# Patient Record
Sex: FEMALE | Race: BLACK OR AFRICAN AMERICAN | NOT HISPANIC OR LATINO | Employment: FULL TIME | URBAN - METROPOLITAN AREA
[De-identification: names, ages, dates, MRNs, and addresses within clinical notes are randomized per-mention and may not be internally consistent; named-entity substitution may affect disease eponyms.]

---

## 2021-10-27 RX ORDER — CLONIDINE HYDROCHLORIDE 0.1 MG/1
TABLET ORAL
COMMUNITY

## 2021-10-27 RX ORDER — HYDROCHLOROTHIAZIDE 25 MG/1
25 TABLET ORAL
COMMUNITY
Start: 2020-07-21

## 2021-10-27 RX ORDER — ERGOCALCIFEROL 1.25 MG/1
CAPSULE ORAL
COMMUNITY

## 2021-10-27 RX ORDER — GABAPENTIN 300 MG/1
300 CAPSULE ORAL
COMMUNITY
Start: 2020-08-17

## 2021-10-27 RX ORDER — ANASTROZOLE 1 MG/1
TABLET ORAL
COMMUNITY

## 2021-10-27 RX ORDER — DILTIAZEM HYDROCHLORIDE 360 MG/1
360 CAPSULE, EXTENDED RELEASE ORAL
COMMUNITY
Start: 2020-09-03

## 2021-10-27 RX ORDER — ALBUTEROL SULFATE 90 UG/1
2 AEROSOL, METERED RESPIRATORY (INHALATION) EVERY 4 HOURS PRN
COMMUNITY
Start: 2020-11-14 | End: 2023-01-31

## 2021-10-27 RX ORDER — DOCUSATE SODIUM 100 MG/1
CAPSULE, LIQUID FILLED ORAL DAILY PRN
COMMUNITY
End: 2021-10-29

## 2021-10-27 RX ORDER — AMOXICILLIN 500 MG
2 CAPSULE ORAL
COMMUNITY

## 2023-01-25 ENCOUNTER — TELEPHONE (OUTPATIENT)
Dept: PULMONOLOGY | Facility: CLINIC | Age: 58
End: 2023-01-25
Payer: COMMERCIAL

## 2023-01-25 DIAGNOSIS — R05.9 COUGH, UNSPECIFIED TYPE: Primary | ICD-10-CM

## 2023-01-25 NOTE — TELEPHONE ENCOUNTER
----- Message from Toyin Abraham sent at 1/25/2023 12:53 PM CST -----  Contact: layla  Patient is calling to speak with the nurse regarding referral. Reports getting a referral sent over an needs to schedule an appointment. Please give the patient a call back at [phone]  Thanks janine

## 2023-01-31 ENCOUNTER — HOSPITAL ENCOUNTER (OUTPATIENT)
Dept: RADIOLOGY | Facility: HOSPITAL | Age: 58
Discharge: HOME | End: 2023-01-31
Attending: INTERNAL MEDICINE
Payer: COMMERCIAL

## 2023-01-31 ENCOUNTER — OFFICE VISIT (OUTPATIENT)
Dept: PULMONOLOGY | Facility: CLINIC | Age: 58
End: 2023-01-31
Payer: COMMERCIAL

## 2023-01-31 ENCOUNTER — CLINICAL SUPPORT (OUTPATIENT)
Dept: PULMONOLOGY | Facility: CLINIC | Age: 58
End: 2023-01-31
Payer: COMMERCIAL

## 2023-01-31 VITALS
SYSTOLIC BLOOD PRESSURE: 128 MMHG | HEIGHT: 68 IN | WEIGHT: 270.06 LBS | OXYGEN SATURATION: 96 % | BODY MASS INDEX: 40.93 KG/M2 | HEART RATE: 59 BPM | RESPIRATION RATE: 18 BRPM | DIASTOLIC BLOOD PRESSURE: 92 MMHG

## 2023-01-31 DIAGNOSIS — J45.991 COUGH VARIANT ASTHMA: ICD-10-CM

## 2023-01-31 DIAGNOSIS — R05.3 CHRONIC COUGH: Primary | ICD-10-CM

## 2023-01-31 DIAGNOSIS — R05.9 COUGH, UNSPECIFIED TYPE: ICD-10-CM

## 2023-01-31 PROCEDURE — 71046 XR CHEST PA AND LATERAL: ICD-10-PCS | Mod: 26,,, | Performed by: RADIOLOGY

## 2023-01-31 PROCEDURE — 99205 OFFICE O/P NEW HI 60 MIN: CPT | Mod: 25,S$GLB,, | Performed by: INTERNAL MEDICINE

## 2023-01-31 PROCEDURE — 95012 PR NITRIC OXIDE EXPIRED GAS DETERMINATION: ICD-10-PCS | Mod: S$GLB,,, | Performed by: INTERNAL MEDICINE

## 2023-01-31 PROCEDURE — 71046 X-RAY EXAM CHEST 2 VIEWS: CPT | Mod: TC

## 2023-01-31 PROCEDURE — 99999 PR PBB SHADOW E&M-EST. PATIENT-LVL V: ICD-10-PCS | Mod: PBBFAC,,, | Performed by: INTERNAL MEDICINE

## 2023-01-31 PROCEDURE — 99999 PR PBB SHADOW E&M-EST. PATIENT-LVL I: CPT | Mod: PBBFAC,,,

## 2023-01-31 PROCEDURE — 99999 PR PBB SHADOW E&M-EST. PATIENT-LVL I: ICD-10-PCS | Mod: PBBFAC,,,

## 2023-01-31 PROCEDURE — 71046 X-RAY EXAM CHEST 2 VIEWS: CPT | Mod: 26,,, | Performed by: RADIOLOGY

## 2023-01-31 PROCEDURE — 95012 NITRIC OXIDE EXP GAS DETER: CPT | Mod: S$GLB,,, | Performed by: INTERNAL MEDICINE

## 2023-01-31 PROCEDURE — 99999 PR PBB SHADOW E&M-EST. PATIENT-LVL V: CPT | Mod: PBBFAC,,, | Performed by: INTERNAL MEDICINE

## 2023-01-31 PROCEDURE — 99205 PR OFFICE/OUTPT VISIT, NEW, LEVL V, 60-74 MIN: ICD-10-PCS | Mod: 25,S$GLB,, | Performed by: INTERNAL MEDICINE

## 2023-01-31 RX ORDER — AZITHROMYCIN 250 MG/1
TABLET, FILM COATED ORAL
Qty: 6 TABLET | Refills: 0 | Status: SHIPPED | OUTPATIENT
Start: 2023-01-31 | End: 2023-12-04

## 2023-01-31 RX ORDER — FLUTICASONE PROPIONATE AND SALMETEROL 250; 50 UG/1; UG/1
1 POWDER RESPIRATORY (INHALATION) 2 TIMES DAILY
Qty: 60 EACH | Refills: 11 | Status: SHIPPED | OUTPATIENT
Start: 2023-01-31 | End: 2023-06-28 | Stop reason: SDUPTHER

## 2023-01-31 RX ORDER — OLMESARTAN MEDOXOMIL 40 MG/1
40 TABLET ORAL
COMMUNITY
Start: 2023-01-04

## 2023-01-31 RX ORDER — ALBUTEROL SULFATE 0.83 MG/ML
2.5 SOLUTION RESPIRATORY (INHALATION)
Qty: 360 ML | Refills: 11 | Status: SHIPPED | OUTPATIENT
Start: 2023-01-31 | End: 2023-06-28 | Stop reason: SDUPTHER

## 2023-01-31 RX ORDER — ALBUTEROL SULFATE 90 UG/1
2 AEROSOL, METERED RESPIRATORY (INHALATION) EVERY 4 HOURS PRN
Qty: 18 G | Refills: 11 | Status: SHIPPED | OUTPATIENT
Start: 2023-01-31 | End: 2023-06-28 | Stop reason: SDUPTHER

## 2023-01-31 RX ORDER — PROMETHAZINE HYDROCHLORIDE AND DEXTROMETHORPHAN HYDROBROMIDE 6.25; 15 MG/5ML; MG/5ML
5 SYRUP ORAL 4 TIMES DAILY PRN
Qty: 240 ML | Refills: 5 | Status: SHIPPED | OUTPATIENT
Start: 2023-01-31 | End: 2023-06-28 | Stop reason: SDUPTHER

## 2023-01-31 RX ORDER — PREDNISONE 20 MG/1
TABLET ORAL
Qty: 20 TABLET | Refills: 0 | Status: SHIPPED | OUTPATIENT
Start: 2023-01-31 | End: 2023-06-28 | Stop reason: ALTCHOICE

## 2023-01-31 RX ORDER — BUDESONIDE 90 UG/1
AEROSOL, POWDER RESPIRATORY (INHALATION) 2 TIMES DAILY
COMMUNITY
Start: 2022-10-21 | End: 2023-01-31 | Stop reason: ALTCHOICE

## 2023-01-31 RX ORDER — IBUPROFEN 100 MG/5ML
1000 SUSPENSION, ORAL (FINAL DOSE FORM) ORAL
COMMUNITY

## 2023-01-31 NOTE — PROCEDURES
Clinical Guide to Interpretation or FeNO Levels :    FeNO  (ppb) LOW INTERMEDIATE HIGH   ADULT VALUES < 25 25-50          > 50   Th2-driven Inflammation Unlikely Likely Significant     Patients FeNO level at this visit : __190__ (ppb)    Interpretation of FeNO measurement in adults:  [] FENO is less than 25 ppb implies non eosinophilic airway inflammation or the absence of airway inflammation.    Comment: Low FENO (<25 ppb) in adult asthmatics with persistent symptoms suggests other etiologies for these symptoms and a lower likelihood of benefit from adding or increasing inhaled glucocorticoids.    [] FENO between 25 and 50 ppb in adults should be interpreted cautiously with reference to the clinical situation (eg, symptomatic, on or off therapy, current smoking).    [x] FENO greater than 50 ppb in adults  suggests eosinophilic airway inflammation   Comment: High FENO (>50 ppb) in adult asthmatics even with atypical symptoms suggests glucocorticoid responsiveness. High FENO (>50 ppb) can help identify poor adherence or uncontrolled inflammation in asthma patients with otherwise seemingly controlled asthma.    Discussion:  A FENO less than 25 ppb in adults and less than 20 ppb in children younger than 12 years of age implies noneosinophilic airway inflammation or the absence of airway inflammation.  A FENO greater than 50 ppb in adults or greater than 35 ppb in children suggests eosinophilic airway inflammation.   Values of FENO between 25 and 50 ppb in adults (20 to 35 ppb in children) should be interpreted cautiously with reference to the clinical situation (eg, symptomatic, on or off therapy, current smoking).  A rising FENO with a greater than 20 percent change and more than 25 ppb (20 ppb in children) from a previously stable level suggests increasing eosinophilic airway inflammation, but there are wide inter-individual differences.  A decrease in FENO greater than 20 percent for values over 50 ppb or more  than 10 ppb for values less than 50 ppb may be clinically important.  ?FENO in other respiratory diseases - Several other diseases are associated with altered levels of exhaled NO: low levels of FENO have been noted in cystic fibrosis, current smoking, pulmonary hypertension, hypothermia, primary ciliary dyskinesia, and bronchopulmonary dysplasia. Elevated FENO has been noted in atopy, nonasthmatic eosinophilic bronchitis, COPD exacerbations, noncystic fibrosis bronchiectasis, and viral upper respiratory infections.    REFERENCE:  ATS Board of Directors, December 2004, and by the ERS Executive Committee, June 2004. ATS/ERS Recommendations for Standardized Procedures for the Online and Offline Measurement of Exhaled Lower Respiratory Nitric Oxide and Nasal Nitric Oxide. Guideline 2005

## 2023-01-31 NOTE — PROGRESS NOTES
Subjective:     Patient ID: Nikki Small is a 57 y.o. female.    Chief Complaint:      HPI Chronic cough after COVID in 2020  ( not vaccinated)   Hx of strept throat - tonsils removed in 30 y/o     History of breast cancer with radiation and chemo in 2015  Hs of respirtaroty infections following breast acacner    Cough  Patient complains of nonproductive cough, productive cough, productive cough with sputum described as yellow, and sneezing. Symptoms began 2  years  ago - folowing COVID pneumonia - hospittalized for 4 days - at [facility] . Symptoms have been unchanged since that time.The cough is barky, nonproductive, and paroxysmal and is aggravated by reclining position. Associated symptoms include: postnasal drip and shortness of breath. Patient does not have new pets. Patient does not have a history of asthma. Patient does not have a history of environmental allergens. Patient has not traveled recently. Patient does not have a history of smoking. Patient has had a previous chest x-ray. Patient has not had a PPD done.      Past Medical History:   Diagnosis Date    Breast cancer, left 2014    Fibroids     Gout     HTN (hypertension)     Hyperlipidemia      Past Surgical History:   Procedure Laterality Date    BILATERAL SALPINGOOPHORECTOMY      BREAST BIOPSY Left 11/2014    BREAST LUMPECTOMY Left 12/2014    CERVICAL DISC SURGERY      HYSTERECTOMY  11/2013    LIV BSO    MYOMECTOMY       Review of patient's allergies indicates:   Allergen Reactions    Hydromorphone (bulk) Itching    Meperidine Itching    Morphine Itching     Current Outpatient Medications on File Prior to Visit   Medication Sig Dispense Refill    ALLOPURINOL ORAL       anastrozole (ARIMIDEX) 1 mg Tab anastrozole 1 mg oral tablet take 1 tablet (1 mg) by oral route once daily   Active      ascorbic acid, vitamin C, (VITAMIN C) 1000 MG tablet Take 1,000 mg by mouth.      atorvastatin calcium (ATORVASTATIN ORAL)       calcium carbonate/vitamin D3 (VITAMIN D-3  ORAL)       CALCIUM ORAL Take by mouth.      cloNIDine (CATAPRES) 0.1 MG tablet clonidine HCl 0.1 mg oral tablet take 1 tablet (0.1 mg) by oral route 2 times per day for 30 days   Active      diltiaZEM (TIAZAC) 360 MG Cs24 Take 360 mg by mouth.      ergocalciferol (ERGOCALCIFEROL) 50,000 unit Cap       gabapentin (NEURONTIN) 300 MG capsule Take 300 mg by mouth.      hydroCHLOROthiazide (HYDRODIURIL) 25 MG tablet Take 25 mg by mouth.      HYDROCHLOROTHIAZIDE ORAL       miconazole NITRATE 2 % (MICOTIN) 2 % top powder Apply topically 2 (two) times daily as needed for Itching. 71 g 0    olmesartan (BENICAR) 40 MG tablet Take 40 mg by mouth.      omega-3 fatty acids/fish oil (FISH OIL-OMEGA-3 FATTY ACIDS) 300-1,000 mg capsule Take 2 g by mouth.      promethazine HCl (PHENERGAN ORAL)       VITAMIN B COMPLEX ORAL Take 1 tablet by mouth.      [DISCONTINUED] albuterol (PROVENTIL/VENTOLIN HFA) 90 mcg/actuation inhaler Inhale 2 puffs into the lungs every 4 (four) hours as needed.      [DISCONTINUED] budesonide (PULMICORT FLEXHALER) 90 mcg/actuation AePB 2 (two) times daily.      clobetasoL (TEMOVATE) 0.05 % cream Apply topically once daily. for 10 days 60 g 0     No current facility-administered medications on file prior to visit.     Social History     Socioeconomic History    Marital status: Single   Tobacco Use    Smoking status: Never    Smokeless tobacco: Never   Substance and Sexual Activity    Alcohol use: Never    Drug use: Never    Sexual activity: Yes     Partners: Male     Family History   Problem Relation Age of Onset    Hypertension Mother     Hypertension Father     Hypertension Maternal Grandmother     Heart disease Maternal Grandmother     Lung cancer Maternal Grandfather     Heart disease Paternal Grandmother     Breast cancer Paternal Aunt        Review of Systems   Constitutional:  Positive for fatigue. Negative for fever.   HENT:  Positive for postnasal drip, rhinorrhea and congestion.    Eyes:  Negative  "for redness and itching.   Respiratory:  Positive for cough, sputum production, shortness of breath, dyspnea on extertion, use of rescue inhaler and Paroxysmal Nocturnal Dyspnea.    Cardiovascular:  Negative for chest pain, palpitations and leg swelling.   Genitourinary:  Negative for difficulty urinating and hematuria.   Endocrine:  Negative for cold intolerance and heat intolerance.    Skin:  Negative for rash.   Gastrointestinal:  Negative for nausea and abdominal pain.   Neurological:  Negative for dizziness, syncope, weakness and light-headedness.   Hematological:  Negative for adenopathy. Does not bruise/bleed easily.   Psychiatric/Behavioral:  Negative for sleep disturbance. The patient is not nervous/anxious.      Objective:      BP (!) 128/92   Pulse (!) 59   Resp 18   Ht 5' 8" (1.727 m)   Wt 122.5 kg (270 lb 1 oz)   SpO2 96%   BMI 41.06 kg/m²   Physical Exam  Vitals and nursing note reviewed.   Constitutional:       Appearance: She is well-developed. She is obese.   HENT:      Head: Normocephalic and atraumatic.      Nose: Nose normal.      Mouth/Throat:      Pharynx: No oropharyngeal exudate.   Eyes:      Conjunctiva/sclera: Conjunctivae normal.      Pupils: Pupils are equal, round, and reactive to light.   Neck:      Thyroid: No thyromegaly.      Vascular: No JVD.      Trachea: No tracheal deviation.   Cardiovascular:      Rate and Rhythm: Normal rate and regular rhythm.      Heart sounds: Normal heart sounds.   Pulmonary:      Effort: Pulmonary effort is normal. No respiratory distress.      Breath sounds: Examination of the right-lower field reveals wheezing. Examination of the left-lower field reveals wheezing. Decreased breath sounds and wheezing present. No rhonchi or rales.   Chest:      Chest wall: No tenderness.   Abdominal:      General: Bowel sounds are normal.      Palpations: Abdomen is soft.   Musculoskeletal:         General: Normal range of motion.      Cervical back: Neck supple. "   Lymphadenopathy:      Cervical: No cervical adenopathy.   Skin:     General: Skin is warm and dry.   Neurological:      Mental Status: She is alert and oriented to person, place, and time.     Personal Diagnostic Review  Chest x-ray: atelectasis on the left     Pulmonary Studies Review 2023   SpO2 96   Height 68   Weight 4321.02   BMI (Calculated) 41.1   Predicted Distance 295.06   Predicted Distance Meters (Calculated) 427.19       X-Ray Chest PA And Lateral  Narrative: EXAM:  XR CHEST PA AND LATERAL    CLINICAL HISTORY: Cough    COMPARISON: None available.    FINDINGS: No confluent airspace opacity or consolidation.  Atelectasis seen within the left upper lobe phase are more conspicuous on lateral view.  Recommend interval follow-up chest radiograph.  There is no evidence of pleural effusion, pneumothorax, or other acute pulmonary disease.  The cardiomediastinal silhouette is within normal limits.  No acute osseous abnormality is evident.  Atherosclerotic disease.  Impression:  Atelectasis seen within the left upper lobe phase are more conspicuous on lateral view.  Recommend interval follow-up chest radiograph.    Finalized on: 2023 9:53 AM By:  Francisco Dumont MD  [facility]# [id number]      2023 09:56:01.660    [facility]      Office Spirometry Results:     No flowsheet data found.  Pulmonary Studies Review 2023   SpO2 96   Height 68   Weight 4321.02   BMI (Calculated) 41.1   Predicted Distance 295.06   Predicted Distance Meters (Calculated) 427.19         Assessment:            Chronic cough  -     CT Chest Without Contrast; Future; Expected date: 2023  -     promethazine-dextromethorphan (PROMETHAZINE-DM) 6.25-15 mg/5 mL Syrp; Take 5 mLs by mouth 4 (four) times daily as needed (cough).  Dispense: 240 mL; Refill: 5    Cough variant asthma  -     Complete PFT with bronchodilator; Future; Expected date: 2023  -     Fraction of  Nitric Oxide; Future  -     fluticasone-salmeterol diskus  inhaler 250-50 mcg; Inhale 1 puff into the lungs 2 (two) times daily. Controller.Wash out mouth after use  Dispense: 60 each; Refill: 11  -     predniSONE (DELTASONE) 20 MG tablet; Prednisone 60 mg/ day for 3 days, 40 mg/day for 3 days,20 mg/ day for 3 days, (1/2 tablet )10 mg a day for 3 days.  Dispense: 20 tablet; Refill: 0  -     azithromycin (ZITHROMAX Z-MEMO) 250 MG tablet; 500 mg on day 1 (two tablets) followed by 250 mg once daily on days 2-5  Dispense: 6 tablet; Refill: 0  -     albuterol (PROVENTIL) 2.5 mg /3 mL (0.083 %) nebulizer solution; Take 3 mLs (2.5 mg total) by nebulization every 4 to 6 hours as needed for Wheezing or Shortness of Breath.  Dispense: 360 mL; Refill: 11  -     NEBULIZER KIT (SUPPLIES) FOR HOME USE  -     NEBULIZER FOR HOME USE  -     promethazine-dextromethorphan (PROMETHAZINE-DM) 6.25-15 mg/5 mL Syrp; Take 5 mLs by mouth 4 (four) times daily as needed (cough).  Dispense: 240 mL; Refill: 5  -     albuterol (PROVENTIL/VENTOLIN HFA) 90 mcg/actuation inhaler; Inhale 2 puffs into the lungs every 4 (four) hours as needed for Wheezing or Shortness of Breath.  Dispense: 18 g; Refill: 11          Outpatient Encounter Medications as of 1/31/2023   Medication Sig Dispense Refill    ALLOPURINOL ORAL       anastrozole (ARIMIDEX) 1 mg Tab anastrozole 1 mg oral tablet take 1 tablet (1 mg) by oral route once daily   Active      ascorbic acid, vitamin C, (VITAMIN C) 1000 MG tablet Take 1,000 mg by mouth.      atorvastatin calcium (ATORVASTATIN ORAL)       calcium carbonate/vitamin D3 (VITAMIN D-3 ORAL)       CALCIUM ORAL Take by mouth.      cloNIDine (CATAPRES) 0.1 MG tablet clonidine HCl 0.1 mg oral tablet take 1 tablet (0.1 mg) by oral route 2 times per day for 30 days   Active      diltiaZEM (TIAZAC) 360 MG Cs24 Take 360 mg by mouth.      ergocalciferol (ERGOCALCIFEROL) 50,000 unit Cap       gabapentin (NEURONTIN) 300 MG capsule Take 300 mg by mouth.      hydroCHLOROthiazide (HYDRODIURIL) 25 MG  tablet Take 25 mg by mouth.      HYDROCHLOROTHIAZIDE ORAL       miconazole NITRATE 2 % (MICOTIN) 2 % top powder Apply topically 2 (two) times daily as needed for Itching. 71 g 0    olmesartan (BENICAR) 40 MG tablet Take 40 mg by mouth.      omega-3 fatty acids/fish oil (FISH OIL-OMEGA-3 FATTY ACIDS) 300-1,000 mg capsule Take 2 g by mouth.      promethazine HCl (PHENERGAN ORAL)       VITAMIN B COMPLEX ORAL Take 1 tablet by mouth.      [DISCONTINUED] albuterol (PROVENTIL/VENTOLIN HFA) 90 mcg/actuation inhaler Inhale 2 puffs into the lungs every 4 (four) hours as needed.      [DISCONTINUED] budesonide (PULMICORT FLEXHALER) 90 mcg/actuation AePB 2 (two) times daily.      albuterol (PROVENTIL) 2.5 mg /3 mL (0.083 %) nebulizer solution Take 3 mLs (2.5 mg total) by nebulization every 4 to 6 hours as needed for Wheezing or Shortness of Breath. 360 mL 11    albuterol (PROVENTIL/VENTOLIN HFA) 90 mcg/actuation inhaler Inhale 2 puffs into the lungs every 4 (four) hours as needed for Wheezing or Shortness of Breath. 18 g 11    azithromycin (ZITHROMAX Z-MEMO) 250 MG tablet 500 mg on day 1 (two tablets) followed by 250 mg once daily on days 2-5 6 tablet 0    clobetasoL (TEMOVATE) 0.05 % cream Apply topically once daily. for 10 days 60 g 0    fluticasone-salmeterol diskus inhaler 250-50 mcg Inhale 1 puff into the lungs 2 (two) times daily. Controller.Wash out mouth after use 60 each 11    predniSONE (DELTASONE) 20 MG tablet Prednisone 60 mg/ day for 3 days, 40 mg/day for 3 days,20 mg/ day for 3 days, (1/2 tablet )10 mg a day for 3 days. 20 tablet 0    promethazine-dextromethorphan (PROMETHAZINE-DM) 6.25-15 mg/5 mL Syrp Take 5 mLs by mouth 4 (four) times daily as needed (cough). 240 mL 5     No facility-administered encounter medications on file as of 1/31/2023.     Plan:       Requested Prescriptions     Signed Prescriptions Disp Refills    fluticasone-salmeterol diskus inhaler 250-50 mcg 60 each 11     Sig: Inhale 1 puff into the  lungs 2 (two) times daily. Controller.Wash out mouth after use    predniSONE (DELTASONE) 20 MG tablet 20 tablet 0     Sig: Prednisone 60 mg/ day for 3 days, 40 mg/day for 3 days,20 mg/ day for 3 days, (1/2 tablet )10 mg a day for 3 days.    azithromycin (ZITHROMAX Z-MEMO) 250 MG tablet 6 tablet 0     Si mg on day 1 (two tablets) followed by 250 mg once daily on days 2-5    albuterol (PROVENTIL) 2.5 mg /3 mL (0.083 %) nebulizer solution 360 mL 11     Sig: Take 3 mLs (2.5 mg total) by nebulization every 4 to 6 hours as needed for Wheezing or Shortness of Breath.    promethazine-dextromethorphan (PROMETHAZINE-DM) 6.25-15 mg/5 mL Syrp 240 mL 5     Sig: Take 5 mLs by mouth 4 (four) times daily as needed (cough).    albuterol (PROVENTIL/VENTOLIN HFA) 90 mcg/actuation inhaler 18 g 11     Sig: Inhale 2 puffs into the lungs every 4 (four) hours as needed for Wheezing or Shortness of Breath.     Problem List Items Addressed This Visit    None  Visit Diagnoses       Chronic cough    -  Primary    Relevant Medications    promethazine-dextromethorphan (PROMETHAZINE-DM) 6.25-15 mg/5 mL Syrp    Other Relevant Orders    CT Chest Without Contrast    Cough variant asthma        Relevant Medications    fluticasone-salmeterol diskus inhaler 250-50 mcg    predniSONE (DELTASONE) 20 MG tablet    azithromycin (ZITHROMAX Z-MEMO) 250 MG tablet    albuterol (PROVENTIL) 2.5 mg /3 mL (0.083 %) nebulizer solution    promethazine-dextromethorphan (PROMETHAZINE-DM) 6.25-15 mg/5 mL Syrp    albuterol (PROVENTIL/VENTOLIN HFA) 90 mcg/actuation inhaler    Other Relevant Orders    Complete PFT with bronchodilator    Fraction of  Nitric Oxide    NEBULIZER KIT (SUPPLIES) FOR HOME USE    NEBULIZER FOR HOME USE               Follow up in about 6 weeks (around 3/14/2023) for Review progress, PFT on return.    MEDICAL DECISION MAKING: Moderate to high complexity.  Overall, the multiple problems listed are of moderate to high severity that may  impact quality of life and activities of daily living. Side effects of medications, treatment plan as well as options and alternatives reviewed and discussed with patient. There was counseling of patient concerning these issues.    Total time spent in counseling and coordination of care - 60  minutes of total time spent on the encounter, which includes face to face time and non-face to face time preparing to see the patient (eg, review of tests), Obtaining and/or reviewing separately obtained history, Documenting clinical information in the electronic or other health record, Independently interpreting results (not separately reported) and communicating results to the patient/family/caregiver, or Care coordination (not separately reported).    Time was used in discussion of prognosis, risks, benefits of treatment, instructions and compliance with regimen . Discussion with other physicians and/or health care providers - home health or for use of durable medical equipment (oxygen, nebulizers, CPAP, BiPAP) occurred.

## 2023-05-02 ENCOUNTER — TELEPHONE (OUTPATIENT)
Dept: PULMONOLOGY | Facility: CLINIC | Age: 58
End: 2023-05-02
Payer: COMMERCIAL

## 2023-05-02 NOTE — TELEPHONE ENCOUNTER
----- Message from Nehal Mcfarlane sent at 5/2/2023  2:37 PM CDT -----  Pt would like return call to schedule PFT and CT on same day.   Please call back at  [phone], [phone], or [phone].  Md Jose Cruz

## 2023-05-03 ENCOUNTER — TELEPHONE (OUTPATIENT)
Dept: PULMONOLOGY | Facility: CLINIC | Age: 58
End: 2023-05-03
Payer: COMMERCIAL

## 2023-05-03 DIAGNOSIS — R06.02 SOB (SHORTNESS OF BREATH): Primary | ICD-10-CM

## 2023-05-03 NOTE — TELEPHONE ENCOUNTER
----- Message from Julee Hughes sent at 5/3/2023  8:15 AM CDT -----  Contact: self [phone]  Patient called in this morning requesting a call back to schedule some test that she needs and states the nurse is the only one that can do it . Please call back [phone]. Thanks bridgette

## 2023-05-09 ENCOUNTER — PATIENT MESSAGE (OUTPATIENT)
Dept: RESEARCH | Facility: HOSPITAL | Age: 58
End: 2023-05-09
Payer: COMMERCIAL

## 2023-06-27 ENCOUNTER — CLINICAL SUPPORT (OUTPATIENT)
Dept: PULMONOLOGY | Facility: CLINIC | Age: 58
End: 2023-06-27
Payer: COMMERCIAL

## 2023-06-27 DIAGNOSIS — R06.02 SOB (SHORTNESS OF BREATH): ICD-10-CM

## 2023-06-27 PROCEDURE — 99999 PR PBB SHADOW E&M-EST. PATIENT-LVL I: CPT | Mod: PBBFAC,,,

## 2023-06-27 PROCEDURE — 99999 PR PBB SHADOW E&M-EST. PATIENT-LVL I: ICD-10-PCS | Mod: PBBFAC,,,

## 2023-06-27 PROCEDURE — 94762 PR NONINVASV OXYGEN SATUR, CONT: ICD-10-PCS | Mod: S$GLB,,, | Performed by: INTERNAL MEDICINE

## 2023-06-27 PROCEDURE — 94762 N-INVAS EAR/PLS OXIMTRY CONT: CPT | Mod: S$GLB,,, | Performed by: INTERNAL MEDICINE

## 2023-06-28 ENCOUNTER — HOSPITAL ENCOUNTER (OUTPATIENT)
Dept: RADIOLOGY | Facility: HOSPITAL | Age: 58
Discharge: HOME | End: 2023-06-28
Attending: INTERNAL MEDICINE
Payer: COMMERCIAL

## 2023-06-28 ENCOUNTER — OFFICE VISIT (OUTPATIENT)
Dept: PULMONOLOGY | Facility: CLINIC | Age: 58
End: 2023-06-28
Payer: COMMERCIAL

## 2023-06-28 ENCOUNTER — CLINICAL SUPPORT (OUTPATIENT)
Dept: PULMONOLOGY | Facility: CLINIC | Age: 58
End: 2023-06-28
Payer: COMMERCIAL

## 2023-06-28 VITALS
HEART RATE: 85 BPM | RESPIRATION RATE: 17 BRPM | WEIGHT: 273.81 LBS | BODY MASS INDEX: 41.5 KG/M2 | SYSTOLIC BLOOD PRESSURE: 118 MMHG | DIASTOLIC BLOOD PRESSURE: 72 MMHG | HEIGHT: 68 IN | OXYGEN SATURATION: 97 %

## 2023-06-28 DIAGNOSIS — G47.33 OSA (OBSTRUCTIVE SLEEP APNEA): Primary | ICD-10-CM

## 2023-06-28 DIAGNOSIS — J45.991 COUGH VARIANT ASTHMA: ICD-10-CM

## 2023-06-28 DIAGNOSIS — G47.34 NOCTURNAL HYPOXEMIA: ICD-10-CM

## 2023-06-28 DIAGNOSIS — R05.3 CHRONIC COUGH: ICD-10-CM

## 2023-06-28 DIAGNOSIS — J44.89 ASTHMA WITH COPD: ICD-10-CM

## 2023-06-28 PROCEDURE — 99999 PR PBB SHADOW E&M-EST. PATIENT-LVL V: CPT | Mod: PBBFAC,,, | Performed by: INTERNAL MEDICINE

## 2023-06-28 PROCEDURE — 94729 DIFFUSING CAPACITY: CPT | Mod: S$GLB,,, | Performed by: INTERNAL MEDICINE

## 2023-06-28 PROCEDURE — 71250 CT THORAX DX C-: CPT | Mod: 26,,, | Performed by: RADIOLOGY

## 2023-06-28 PROCEDURE — 94726 PULM FUNCT TST PLETHYSMOGRAP: ICD-10-PCS | Mod: S$GLB,,, | Performed by: INTERNAL MEDICINE

## 2023-06-28 PROCEDURE — 99999 PR PBB SHADOW E&M-EST. PATIENT-LVL V: ICD-10-PCS | Mod: PBBFAC,,, | Performed by: INTERNAL MEDICINE

## 2023-06-28 PROCEDURE — 94726 PLETHYSMOGRAPHY LUNG VOLUMES: CPT | Mod: S$GLB,,, | Performed by: INTERNAL MEDICINE

## 2023-06-28 PROCEDURE — 99214 OFFICE O/P EST MOD 30 MIN: CPT | Mod: 25,S$GLB,, | Performed by: INTERNAL MEDICINE

## 2023-06-28 PROCEDURE — 71250 CT CHEST WITHOUT CONTRAST: ICD-10-PCS | Mod: 26,,, | Performed by: RADIOLOGY

## 2023-06-28 PROCEDURE — 99999 PR PBB SHADOW E&M-EST. PATIENT-LVL I: CPT | Mod: PBBFAC,,,

## 2023-06-28 PROCEDURE — 94729 PR C02/MEMBANE DIFFUSE CAPACITY: ICD-10-PCS | Mod: S$GLB,,, | Performed by: INTERNAL MEDICINE

## 2023-06-28 PROCEDURE — 99214 PR OFFICE/OUTPT VISIT, EST, LEVL IV, 30-39 MIN: ICD-10-PCS | Mod: 25,S$GLB,, | Performed by: INTERNAL MEDICINE

## 2023-06-28 PROCEDURE — 99999 PR PBB SHADOW E&M-EST. PATIENT-LVL I: ICD-10-PCS | Mod: PBBFAC,,,

## 2023-06-28 PROCEDURE — 71250 CT THORAX DX C-: CPT | Mod: TC

## 2023-06-28 PROCEDURE — 94060 PR EVAL OF BRONCHOSPASM: ICD-10-PCS | Mod: S$GLB,,, | Performed by: INTERNAL MEDICINE

## 2023-06-28 PROCEDURE — 94060 EVALUATION OF WHEEZING: CPT | Mod: S$GLB,,, | Performed by: INTERNAL MEDICINE

## 2023-06-28 RX ORDER — DOXYCYCLINE 100 MG/1
100 CAPSULE ORAL EVERY 12 HOURS
Qty: 20 CAPSULE | Refills: 0 | Status: SHIPPED | OUTPATIENT
Start: 2023-06-28 | End: 2023-12-04

## 2023-06-28 RX ORDER — ALBUTEROL SULFATE 0.83 MG/ML
2.5 SOLUTION RESPIRATORY (INHALATION)
Qty: 360 ML | Refills: 11 | Status: SHIPPED | OUTPATIENT
Start: 2023-06-28 | End: 2024-06-27

## 2023-06-28 RX ORDER — METHYLPREDNISOLONE 4 MG/1
TABLET ORAL
Qty: 1 EACH | Refills: 0 | Status: SHIPPED | OUTPATIENT
Start: 2023-06-28 | End: 2023-12-04

## 2023-06-28 RX ORDER — FLUTICASONE PROPIONATE AND SALMETEROL 250; 50 UG/1; UG/1
1 POWDER RESPIRATORY (INHALATION) 2 TIMES DAILY
Qty: 60 EACH | Refills: 11 | Status: SHIPPED | OUTPATIENT
Start: 2023-06-28 | End: 2024-06-27

## 2023-06-28 RX ORDER — ALBUTEROL SULFATE 90 UG/1
2 AEROSOL, METERED RESPIRATORY (INHALATION) EVERY 4 HOURS PRN
Qty: 18 G | Refills: 11 | Status: SHIPPED | OUTPATIENT
Start: 2023-06-28

## 2023-06-28 RX ORDER — CYANOCOBALAMIN (VITAMIN B-12) 2000 MCG
1000 TABLET ORAL
COMMUNITY

## 2023-06-28 RX ORDER — PROMETHAZINE HYDROCHLORIDE AND DEXTROMETHORPHAN HYDROBROMIDE 6.25; 15 MG/5ML; MG/5ML
5 SYRUP ORAL 4 TIMES DAILY PRN
Qty: 240 ML | Refills: 5 | Status: SHIPPED | OUTPATIENT
Start: 2023-06-28 | End: 2023-12-04

## 2023-06-28 RX ORDER — INDOMETHACIN 50 MG/1
CAPSULE ORAL
COMMUNITY
Start: 2023-06-13

## 2023-06-28 NOTE — PROCEDURES
[address] * [place] [address]  Telephone: [phone]  Test date: 23 Start: 23 23:59:24 Nikki Small  Doctor: MD Roberto End: 23 06:58:28 [id number]  Oximetry: Summary Report  Comments: room air  Recording time: 06:59:04 Highest pulse: 93 Highest SpO2: 99%  Excluded samplin:38:52 Lowest pulse: 46 Lowest SpO2: 80%  Total valid samplin:20:12 Mean pulse: 58 Mean SpO2: 91.7%  1 S.D.: 5.7 1 S.D.: 2.0  Time with SpO2<90: 0:44:24, 11.7%  Time with SpO2<80: 0:00:00, 0.0%  Time with SpO2<70: 0:00:00, 0.0%  Time with SpO2<60: 0:00:00, 0.0%  Time with SpO2<89: 0:16:52, 4.4%  Time with SpO2 =>90: 5:35:48, 88.3%  Time with SpO2=>80 & <90: 0:44:24, 11.7%  Time with SpO2=>70 & <80: 0:00:00, 0.0%  Time with SpO2=>60 & <70: 0:00:00, 0.0%  The longest continuous time with saturation <=88 was 00:01:40, which started at  23 03:03:20.  A desaturation event was defined as a decrease of saturation by 4 or more.  No events were excluded due to artifact.  There were 16 desaturation events over 3 minutes duration.  There were 41 desaturation events of less than 3 minutes duration during which:  The mean high was 93.9%. The mean low was 88.3%.  The number of these events that were:  > 0 & <10 seconds: 0 > 0 seconds: 41  =>10 & <20 seconds: 12 =>10 seconds: 41  =>20 & <30 seconds: 6 =>20 seconds: 29  =>30 & <40 seconds: 2 =>30 seconds: 23  =>40 & <50 seconds: 5 =>40 seconds: 21  =>50 & <60 seconds: 2 =>50 seconds: 16  =>60 seconds: 14 =>60 seconds: 14  The mean length of desaturation events that were >=10 sec & <=3 mins was: 54.8 sec.  Desaturation event index (events >=10 sec per sampled hour): 6.5  Desaturation event index (events >= 0 sec per sampled hour): 6.5    Overnight Oxygen Saturation Study:    INTERPRETATION:  Conditions of Test: Noted above in report    Interpretation of results of overnight oxygen saturation study.  This was a technically  adequate study.  Overnight oxygen  saturation study is abnormal with O2 saturation less than 89%.   Time with SpO2<89: 0:16:52, 4.4% of the study period. Lowest oxygen saturation recorded was 80% .    Based on the above information patient meets criteria for oxygen prescription.  Clinical correlation suggested.  Bebo Mejia MD    Medicare Criteria Comments:   Overnight Oximetry test results suggest the patient does fall under Medicare Group 1 Criteria and would be eligible for oxygen prescription.   (Arterial oxygen saturation at or below 88% for at least 5 minutes taken during sleep on stable outpatient)    Details about Medicare Group Criteria coverage can be found at http://www.cms.hhs.gov/manuals/downloads/

## 2023-06-28 NOTE — PROGRESS NOTES
Subjective:     Patient ID: Nikki Small is a 58 y.o. female.    Chief Complaint:      HPI    Cough  Patient complains of nonproductive cough, productive cough, productive cough with sputum described as yellow, and sneezing. Symptoms began 2  years  ago - folowing COVID pneumonia - hospittalized for 4 days - at [facility] . Symptoms have been unchanged since that time.The cough is barky, nonproductive, and paroxysmal and is aggravated by reclining position. Associated symptoms include: postnasal drip and shortness of breath. Patient does not have new pets. Patient does not have a history of asthma. Patient does not have a history of environmental allergens. Patient has not traveled recently. Patient does not have a history of smoking. Patient has had a previous chest x-ray. Patient has not had a PPD done.      History of Obstructive Sleep Apnea:    Sleep Apnea  She presents for a sleep evaluation. She complains of snoring, periods of not breathing, sinus problems, congested nose.  Symptoms began  20 +  years ago, unchanged since that time.  She goes to sleep at 10;30 weekdays and  weekends. She awakens 6 weekdays and 9 weekends. She falls asleep in 10 minutes.  Collar size na. She denies knees buckling with laughing, completely or partially paralyzed while falling asleep or waking up. Previous evaluation and treatment has included PSG.  Awakens due to coughin g  History of tonsillectomy for Obstructive Sleep Apnea   Stopped Continuous Positive Airway Pressure years ago    Past Medical History:   Diagnosis Date    Breast cancer, left 2014    Fibroids     Gout     HTN (hypertension)     Hyperlipidemia      Past Surgical History:   Procedure Laterality Date    BILATERAL SALPINGOOPHORECTOMY      BREAST BIOPSY Left 11/2014    BREAST LUMPECTOMY Left 12/2014    CERVICAL DISC SURGERY      HYSTERECTOMY  11/2013    [facility]    MYOMECTOMY       Review of patient's allergies indicates:   Allergen Reactions    Hydromorphone (bulk)  Itching    Meperidine Itching    Morphine Itching     Current Outpatient Medications on File Prior to Visit   Medication Sig Dispense Refill    ALLOPURINOL ORAL       anastrozole (ARIMIDEX) 1 mg Tab anastrozole 1 mg oral tablet take 1 tablet (1 mg) by oral route once daily   Active      ascorbic acid, vitamin C, (VITAMIN C) 1000 MG tablet Take 1,000 mg by mouth.      atorvastatin calcium (ATORVASTATIN ORAL)       azithromycin (ZITHROMAX Z-MEMO) 250 MG tablet 500 mg on day 1 (two tablets) followed by 250 mg once daily on days 2-5 6 tablet 0    calcium carbonate/vitamin D3 (VITAMIN D-3 ORAL)       CALCIUM ORAL Take by mouth.      clobetasoL (TEMOVATE) 0.05 % cream APPLY TO THE AFFECTED AREA EVERY DAY FOR 10 DAYS ONLY 60 g 0    cloNIDine (CATAPRES) 0.1 MG tablet clonidine HCl 0.1 mg oral tablet take 1 tablet (0.1 mg) by oral route 2 times per day for 30 days   Active      cyanocobalamin, vitamin B-12, 2,000 mcg Tab Take 1,000 mcg by mouth.      diltiaZEM (TIAZAC) 360 MG Cs24 Take 360 mg by mouth.      ergocalciferol (ERGOCALCIFEROL) 50,000 unit Cap       jessy prim-linoleic-gamolenic ac (PRIMROSE OIL) 1,000 mg Cap Take 1 capsule by mouth every morning.      gabapentin (NEURONTIN) 300 MG capsule Take 300 mg by mouth.      hydroCHLOROthiazide (HYDRODIURIL) 25 MG tablet Take 25 mg by mouth.      HYDROCHLOROTHIAZIDE ORAL       indomethacin (INDOCIN) 50 MG capsule Take by mouth.      miconazole NITRATE 2 % (MICOTIN) 2 % top powder Apply topically 2 (two) times daily as needed for Itching. 71 g 0    olmesartan (BENICAR) 40 MG tablet Take 40 mg by mouth.      omega-3 fatty acids/fish oil (FISH OIL-OMEGA-3 FATTY ACIDS) 300-1,000 mg capsule Take 2 g by mouth.      promethazine HCl (PHENERGAN ORAL)       VITAMIN B COMPLEX ORAL Take 1 tablet by mouth.       No current facility-administered medications on file prior to visit.     Social History     Socioeconomic History    Marital status: Single   Tobacco Use    Smoking status: Never  "   Smokeless tobacco: Never   Substance and Sexual Activity    Alcohol use: Never    Drug use: Never    Sexual activity: Yes     Partners: Male     Family History   Problem Relation Age of Onset    Hypertension Mother     Hypertension Father     Hypertension Maternal Grandmother     Heart disease Maternal Grandmother     Lung cancer Maternal Grandfather     Heart disease Paternal Grandmother     Breast cancer Paternal Aunt        Review of Systems   Constitutional:  Positive for fatigue. Negative for fever.   HENT:  Positive for postnasal drip, rhinorrhea and congestion.    Eyes:  Negative for redness and itching.   Respiratory:  Positive for cough, sputum production, shortness of breath, dyspnea on extertion, use of rescue inhaler and Paroxysmal Nocturnal Dyspnea.    Cardiovascular:  Negative for chest pain, palpitations and leg swelling.   Genitourinary:  Negative for difficulty urinating and hematuria.   Endocrine:  Negative for cold intolerance and heat intolerance.    Skin:  Negative for rash.   Gastrointestinal:  Negative for nausea and abdominal pain.   Neurological:  Negative for dizziness, syncope, weakness and light-headedness.   Hematological:  Negative for adenopathy. Does not bruise/bleed easily.   Psychiatric/Behavioral:  Negative for sleep disturbance. The patient is not nervous/anxious.      Objective:      /72   Pulse 85   Resp 17   Ht 5' 8" (1.727 m)   Wt 124.2 kg (273 lb 13 oz)   SpO2 97%   BMI 41.63 kg/m²   Physical Exam  Vitals and nursing note reviewed.   Constitutional:       Appearance: She is well-developed. She is obese.   HENT:      Head: Normocephalic and atraumatic.      Nose: Congestion present.      Mouth/Throat:      Pharynx: No oropharyngeal exudate.   Eyes:      Conjunctiva/sclera: Conjunctivae normal.      Pupils: Pupils are equal, round, and reactive to light.   Neck:      Thyroid: No thyromegaly.      Vascular: No JVD.      Trachea: No tracheal deviation. "   Cardiovascular:      Rate and Rhythm: Normal rate and regular rhythm.      Heart sounds: Normal heart sounds.   Pulmonary:      Effort: Pulmonary effort is normal. No respiratory distress.      Breath sounds: Examination of the right-lower field reveals wheezing. Examination of the left-lower field reveals wheezing. Decreased breath sounds and wheezing present. No rhonchi or rales.   Chest:      Chest wall: No tenderness.   Abdominal:      General: Bowel sounds are normal.      Palpations: Abdomen is soft.   Musculoskeletal:         General: Normal range of motion.      Cervical back: Neck supple.   Lymphadenopathy:      Cervical: No cervical adenopathy.   Skin:     General: Skin is warm and dry.   Neurological:      Mental Status: She is alert and oriented to person, place, and time.     Personal Diagnostic Review  Spirometry is normal. Spirometry is improved following bronchodilator administration. Lung volume determination is normal. Airway mechanics show airway resistance is increased. Specific conductance remains normal. DLCO is mildly decreased. MVV is mildly   decreased.     Pulmonary Studies Review 6/28/2023   SpO2 97   Height 68   Weight 4380.98   BMI (Calculated) 41.6   Predicted Distance 286.11   Predicted Distance Meters (Calculated) 417.52       CT Chest Without Contrast  Narrative: EXAMINATION:  CT CHEST WITHOUT CONTRAST    CLINICAL HISTORY:  Cough, persistent;HX of COVID 19 , HX of radiation breast on the left; Chronic cough    TECHNIQUE:  The chest was surveyed from the apices through the posterior costophrenic angles .  Data was reconstructed for multiplanar images in axial, sagittal and coronal planes in for maximal intensity projection images in the axial plane.    COMPARISON:  01/31/2023    FINDINGS:  Base of Neck: No significant abnormality.    Airways: Patent.    Lungs: Clear lungs.    Pleura: No pleural fluid.No pleural calcification.    Fiona/Mediastinum: No pathologic pierre  enlargement.    Esophagus: Normal.    Heart/pericardium: Normal size.  Trace pericardial effusion no calcification.    Pulmonary vasculature: Pulmonary arteries distribute normally.  There are four pulmonary veins.    Aorta: Left-sided aortic arch with 3 arterial branches.  The aorta maintains normal caliber, contour and course. There is mild calcification of the thoracic aorta.  There is  mild coronary artery calcification.    Thoracic soft tissues: Normal. Both breasts are present.    Bones: No acute fracture.  Mild multilevel spondylosis.  Postoperative changes with seen within the lower cervical spine mild canal stenosis at T2/3.  Moderate bilateral neural foraminal narrowing at seen at T3/4.  No suspicious lytic or sclerotic lesion.    Upper Abdomen: No abnormality of the partially imaged upper abdomen.  Bilateral renal hypodensities measuring up to 3.5 cm on the left and 2.7 cm on the right thought to reflect cysts.  Impression: Source of patient's chronic cough is not identified.    All CT scans at this facility use dose modulation, iterative reconstruction and/or weight based dosing when appropriate to reduce radiation dose to as low as reasonably achievable.    Electronically signed by: Francisco Dumont MD  Date:    06/28/2023  Time:    14:11      Office Spirometry Results:     No flowsheet data found.  Pulmonary Studies Review 6/28/2023   SpO2 97   Height 68   Weight 4380.98   BMI (Calculated) 41.6   Predicted Distance 286.11   Predicted Distance Meters (Calculated) 417.52         Assessment:            NELA (obstructive sleep apnea)  -     Home Sleep Study; Future; Expected date: 06/28/2023    Chronic cough  -     promethazine-dextromethorphan (PROMETHAZINE-DM) 6.25-15 mg/5 mL Syrp; Take 5 mLs by mouth 4 (four) times daily as needed (cough).  Dispense: 240 mL; Refill: 5    Cough variant asthma  -     promethazine-dextromethorphan (PROMETHAZINE-DM) 6.25-15 mg/5 mL Syrp; Take 5 mLs by mouth 4 (four) times daily  as needed (cough).  Dispense: 240 mL; Refill: 5  -     fluticasone-salmeterol diskus inhaler 250-50 mcg; Inhale 1 puff into the lungs 2 (two) times daily. Controller.Wash out mouth after use  Dispense: 60 each; Refill: 11  -     albuterol (PROVENTIL/VENTOLIN HFA) 90 mcg/actuation inhaler; Inhale 2 puffs into the lungs every 4 (four) hours as needed for Wheezing or Shortness of Breath.  Dispense: 18 g; Refill: 11  -     albuterol (PROVENTIL) 2.5 mg /3 mL (0.083 %) nebulizer solution; Take 3 mLs (2.5 mg total) by nebulization every 4 to 6 hours as needed for Wheezing or Shortness of Breath.  Dispense: 360 mL; Refill: 11    Asthma with COPD  -     fluticasone-salmeterol diskus inhaler 250-50 mcg; Inhale 1 puff into the lungs 2 (two) times daily. Controller.Wash out mouth after use  Dispense: 60 each; Refill: 11  -     albuterol (PROVENTIL/VENTOLIN HFA) 90 mcg/actuation inhaler; Inhale 2 puffs into the lungs every 4 (four) hours as needed for Wheezing or Shortness of Breath.  Dispense: 18 g; Refill: 11  -     albuterol (PROVENTIL) 2.5 mg /3 mL (0.083 %) nebulizer solution; Take 3 mLs (2.5 mg total) by nebulization every 4 to 6 hours as needed for Wheezing or Shortness of Breath.  Dispense: 360 mL; Refill: 11  -     methylPREDNISolone (MEDROL DOSEPACK) 4 mg tablet; use as directed  Dispense: 1 each; Refill: 0  -     doxycycline (MONODOX) 100 MG capsule; Take 1 capsule (100 mg total) by mouth every 12 (twelve) hours.  Dispense: 20 capsule; Refill: 0    Nocturnal hypoxemia  -     Home Sleep Study; Future; Expected date: 06/28/2023          Outpatient Encounter Medications as of 6/28/2023   Medication Sig Dispense Refill    ALLOPURINOL ORAL       anastrozole (ARIMIDEX) 1 mg Tab anastrozole 1 mg oral tablet take 1 tablet (1 mg) by oral route once daily   Active      ascorbic acid, vitamin C, (VITAMIN C) 1000 MG tablet Take 1,000 mg by mouth.      atorvastatin calcium (ATORVASTATIN ORAL)       azithromycin (ZITHROMAX Z-MEMO)  250 MG tablet 500 mg on day 1 (two tablets) followed by 250 mg once daily on days 2-5 6 tablet 0    calcium carbonate/vitamin D3 (VITAMIN D-3 ORAL)       CALCIUM ORAL Take by mouth.      clobetasoL (TEMOVATE) 0.05 % cream APPLY TO THE AFFECTED AREA EVERY DAY FOR 10 DAYS ONLY 60 g 0    cloNIDine (CATAPRES) 0.1 MG tablet clonidine HCl 0.1 mg oral tablet take 1 tablet (0.1 mg) by oral route 2 times per day for 30 days   Active      cyanocobalamin, vitamin B-12, 2,000 mcg Tab Take 1,000 mcg by mouth.      diltiaZEM (TIAZAC) 360 MG Cs24 Take 360 mg by mouth.      ergocalciferol (ERGOCALCIFEROL) 50,000 unit Cap       jessy prim-linoleic-gamolenic ac (PRIMROSE OIL) 1,000 mg Cap Take 1 capsule by mouth every morning.      gabapentin (NEURONTIN) 300 MG capsule Take 300 mg by mouth.      hydroCHLOROthiazide (HYDRODIURIL) 25 MG tablet Take 25 mg by mouth.      HYDROCHLOROTHIAZIDE ORAL       indomethacin (INDOCIN) 50 MG capsule Take by mouth.      miconazole NITRATE 2 % (MICOTIN) 2 % top powder Apply topically 2 (two) times daily as needed for Itching. 71 g 0    olmesartan (BENICAR) 40 MG tablet Take 40 mg by mouth.      omega-3 fatty acids/fish oil (FISH OIL-OMEGA-3 FATTY ACIDS) 300-1,000 mg capsule Take 2 g by mouth.      promethazine HCl (PHENERGAN ORAL)       VITAMIN B COMPLEX ORAL Take 1 tablet by mouth.      [DISCONTINUED] albuterol (PROVENTIL) 2.5 mg /3 mL (0.083 %) nebulizer solution Take 3 mLs (2.5 mg total) by nebulization every 4 to 6 hours as needed for Wheezing or Shortness of Breath. 360 mL 11    [DISCONTINUED] albuterol (PROVENTIL/VENTOLIN HFA) 90 mcg/actuation inhaler Inhale 2 puffs into the lungs every 4 (four) hours as needed for Wheezing or Shortness of Breath. 18 g 11    [DISCONTINUED] fluticasone-salmeterol diskus inhaler 250-50 mcg Inhale 1 puff into the lungs 2 (two) times daily. Controller.Wash out mouth after use 60 each 11    [DISCONTINUED] promethazine-dextromethorphan (PROMETHAZINE-DM) 6.25-15 mg/5 mL  Syrp Take 5 mLs by mouth 4 (four) times daily as needed (cough). 240 mL 5    albuterol (PROVENTIL) 2.5 mg /3 mL (0.083 %) nebulizer solution Take 3 mLs (2.5 mg total) by nebulization every 4 to 6 hours as needed for Wheezing or Shortness of Breath. 360 mL 11    albuterol (PROVENTIL/VENTOLIN HFA) 90 mcg/actuation inhaler Inhale 2 puffs into the lungs every 4 (four) hours as needed for Wheezing or Shortness of Breath. 18 g 11    doxycycline (MONODOX) 100 MG capsule Take 1 capsule (100 mg total) by mouth every 12 (twelve) hours. 20 capsule 0    fluticasone-salmeterol diskus inhaler 250-50 mcg Inhale 1 puff into the lungs 2 (two) times daily. Controller.Wash out mouth after use 60 each 11    methylPREDNISolone (MEDROL DOSEPACK) 4 mg tablet use as directed 1 each 0    promethazine-dextromethorphan (PROMETHAZINE-DM) 6.25-15 mg/5 mL Syrp Take 5 mLs by mouth 4 (four) times daily as needed (cough). 240 mL 5    [DISCONTINUED] clobetasoL (TEMOVATE) 0.05 % cream Apply topically once daily. for 10 days 60 g 0    [DISCONTINUED] predniSONE (DELTASONE) 20 MG tablet Prednisone 60 mg/ day for 3 days, 40 mg/day for 3 days,20 mg/ day for 3 days, (1/2 tablet )10 mg a day for 3 days. (Patient not taking: Reported on 6/28/2023) 20 tablet 0     No facility-administered encounter medications on file as of 6/28/2023.     Plan:       Requested Prescriptions     Signed Prescriptions Disp Refills    promethazine-dextromethorphan (PROMETHAZINE-DM) 6.25-15 mg/5 mL Syrp 240 mL 5     Sig: Take 5 mLs by mouth 4 (four) times daily as needed (cough).    fluticasone-salmeterol diskus inhaler 250-50 mcg 60 each 11     Sig: Inhale 1 puff into the lungs 2 (two) times daily. Controller.Wash out mouth after use    albuterol (PROVENTIL/VENTOLIN HFA) 90 mcg/actuation inhaler 18 g 11     Sig: Inhale 2 puffs into the lungs every 4 (four) hours as needed for Wheezing or Shortness of Breath.    albuterol (PROVENTIL) 2.5 mg /3 mL (0.083 %) nebulizer solution 360  mL 11     Sig: Take 3 mLs (2.5 mg total) by nebulization every 4 to 6 hours as needed for Wheezing or Shortness of Breath.    methylPREDNISolone (MEDROL DOSEPACK) 4 mg tablet 1 each 0     Sig: use as directed    doxycycline (MONODOX) 100 MG capsule 20 capsule 0     Sig: Take 1 capsule (100 mg total) by mouth every 12 (twelve) hours.     Problem List Items Addressed This Visit    None  Visit Diagnoses       NELA (obstructive sleep apnea)    -  Primary    Relevant Orders    Home Sleep Study    Chronic cough        Relevant Medications    promethazine-dextromethorphan (PROMETHAZINE-DM) 6.25-15 mg/5 mL Syrp    Cough variant asthma        Relevant Medications    promethazine-dextromethorphan (PROMETHAZINE-DM) 6.25-15 mg/5 mL Syrp    fluticasone-salmeterol diskus inhaler 250-50 mcg    albuterol (PROVENTIL/VENTOLIN HFA) 90 mcg/actuation inhaler    albuterol (PROVENTIL) 2.5 mg /3 mL (0.083 %) nebulizer solution    Asthma with COPD        Relevant Medications    fluticasone-salmeterol diskus inhaler 250-50 mcg    albuterol (PROVENTIL/VENTOLIN HFA) 90 mcg/actuation inhaler    albuterol (PROVENTIL) 2.5 mg /3 mL (0.083 %) nebulizer solution    methylPREDNISolone (MEDROL DOSEPACK) 4 mg tablet    doxycycline (MONODOX) 100 MG capsule    Nocturnal hypoxemia        Relevant Orders    Home Sleep Study               Follow up in about 4 weeks (around 7/26/2023) for Review Sleep Study - on return.    MEDICAL DECISION MAKING: Moderate to high complexity.  Overall, the multiple problems listed are of moderate to high severity that may impact quality of life and activities of daily living. Side effects of medications, treatment plan as well as options and alternatives reviewed and discussed with patient. There was counseling of patient concerning these issues.    Total time spent in counseling and coordination of care - 40  minutes of total time spent on the encounter, which includes face to face time and non-face to face time preparing to  see the patient (eg, review of tests), Obtaining and/or reviewing separately obtained history, Documenting clinical information in the electronic or other health record, Independently interpreting results (not separately reported) and communicating results to the patient/family/caregiver, or Care coordination (not separately reported).    Time was used in discussion of prognosis, risks, benefits of treatment, instructions and compliance with regimen . Discussion with other physicians and/or health care providers - home health or for use of durable medical equipment (oxygen, nebulizers, CPAP, BiPAP) occurred.

## 2023-06-28 NOTE — PATIENT INSTRUCTIONS
[facility] to schedule sleep study at  [phone] . Usually take 1- 2 days to get insurance company approval.  You will need to schedule at follow up clinic appointment 7 days after the sleep study to review the results.

## 2023-06-29 ENCOUNTER — TELEPHONE (OUTPATIENT)
Dept: SLEEP MEDICINE | Facility: CLINIC | Age: 58
End: 2023-06-29
Payer: COMMERCIAL

## 2023-06-29 LAB
BRPFT: ABNORMAL
DLCO ADJ PRE: 16.99 ML/(MIN*MMHG) (ref 20–31.47)
DLCO SINGLE BREATH LLN: 20
DLCO SINGLE BREATH PRE REF: 66 %
DLCO SINGLE BREATH REF: 25.73
DLCOC SBVA LLN: 3.27
DLCOC SBVA PRE REF: 98.4 %
DLCOC SBVA REF: 4.57
DLCOC SINGLE BREATH LLN: 20
DLCOC SINGLE BREATH PRE REF: 66 %
DLCOC SINGLE BREATH REF: 25.73
DLCOVA LLN: 3.27
DLCOVA PRE REF: 98.4 %
DLCOVA PRE: 4.5 ML/(MIN*MMHG*L) (ref 3.27–5.87)
DLCOVA REF: 4.57
DLVAADJ PRE: 4.5 ML/(MIN*MMHG*L) (ref 3.27–5.87)
ERV LLN: -16449.11
ERV PRE REF: 25.3 %
ERV REF: 0.89
FEF 25 75 CHG: 76.3 %
FEF 25 75 LLN: 1.04
FEF 25 75 POST REF: 80.4 %
FEF 25 75 PRE REF: 45.6 %
FEF 25 75 REF: 2.32
FET100 CHG: -18.6 %
FEV1 CHG: 16.8 %
FEV1 FVC CHG: 9.9 %
FEV1 FVC LLN: 68
FEV1 FVC POST REF: 96.6 %
FEV1 FVC PRE REF: 87.9 %
FEV1 FVC REF: 80
FEV1 LLN: 1.87
FEV1 POST REF: 80.4 %
FEV1 PRE REF: 68.9 %
FEV1 REF: 2.54
FRCPLETH LLN: 2.11
FRCPLETH PREREF: 99.4 %
FRCPLETH REF: 2.93
FVC CHG: 6.2 %
FVC LLN: 2.4
FVC POST REF: 82.7 %
FVC PRE REF: 77.9 %
FVC REF: 3.21
IVC PRE: 2.08 L (ref 2.4–4.02)
IVC SINGLE BREATH LLN: 2.4
IVC SINGLE BREATH PRE REF: 64.6 %
IVC SINGLE BREATH REF: 3.21
MVV LLN: 95
MVV PRE REF: 72.1 %
MVV REF: 112
PEF CHG: 10.1 %
PEF LLN: 4.22
PEF POST REF: 92.1 %
PEF PRE REF: 83.6 %
PEF REF: 6.49
POST FEF 25 75: 1.86 L/S (ref 1.04–3.59)
POST FET 100: 7.94 SEC
POST FEV1 FVC: 76.84 % (ref 68.28–90.88)
POST FEV1: 2.04 L (ref 1.87–3.2)
POST FVC: 2.66 L (ref 2.4–4.02)
POST PEF: 5.98 L/S (ref 4.22–8.76)
PRE DLCO: 16.99 ML/(MIN*MMHG) (ref 20–31.47)
PRE ERV: 0.23 L (ref -16449.11–16450.89)
PRE FEF 25 75: 1.06 L/S (ref 1.04–3.59)
PRE FET 100: 9.74 SEC
PRE FEV1 FVC: 69.92 % (ref 68.28–90.88)
PRE FEV1: 1.75 L (ref 1.87–3.2)
PRE FRC PL: 2.92 L
PRE FVC: 2.5 L (ref 2.4–4.02)
PRE MVV: 81 L/MIN (ref 95.49–129.19)
PRE PEF: 5.43 L/S (ref 4.22–8.76)
PRE RV: 2.54 L (ref 1.47–2.62)
PRE TLC: 5.22 L (ref 4.64–6.62)
RAW LLN: 3.06
RAW PRE REF: 107 %
RAW PRE: 3.27 CMH2O*S/L (ref 3.06–3.06)
RAW REF: 3.06
RV LLN: 1.47
RV PRE REF: 124.2 %
RV REF: 2.04
RVTLC LLN: 29
RVTLC PRE REF: 126.7 %
RVTLC PRE: 48.57 % (ref 28.75–47.93)
RVTLC REF: 38
TLC LLN: 4.64
TLC PRE REF: 92.8 %
TLC REF: 5.63
VA PRE: 3.78 L (ref 5.48–5.48)
VA SINGLE BREATH LLN: 5.48
VA SINGLE BREATH PRE REF: 68.9 %
VA SINGLE BREATH REF: 5.48
VC LLN: 2.4
VC PRE REF: 83.7 %
VC PRE: 2.69 L (ref 2.4–4.02)
VC REF: 3.21
VTGRAWPRE: 4.13 L

## 2023-07-09 ENCOUNTER — PATIENT MESSAGE (OUTPATIENT)
Dept: PULMONOLOGY | Facility: CLINIC | Age: 58
End: 2023-07-09
Payer: COMMERCIAL

## 2023-07-09 DIAGNOSIS — G47.34 NOCTURNAL HYPOXEMIA: ICD-10-CM

## 2023-07-09 DIAGNOSIS — G47.33 OSA (OBSTRUCTIVE SLEEP APNEA): Primary | ICD-10-CM

## 2023-07-10 NOTE — TELEPHONE ENCOUNTER
Order for oxygen at night submitted    Subjective:     Patient ID: Nikki Small is a 58 y.o. female.    Chief Complaint:  Irregular Breathing at night    HPI    Cough  Patient complains of nonproductive cough, productive cough, productive cough with sputum described as yellow, and sneezing. Symptoms began 2  years  ago - folowing COVID pneumonia - hospittalized for 4 days - at [facility] . Symptoms have been unchanged since that time.The cough is barky, nonproductive, and paroxysmal and is aggravated by reclining position. Associated symptoms include: postnasal drip and shortness of breath. Patient does not have new pets. Patient does not have a history of asthma. Patient does not have a history of environmental allergens. Patient has not traveled recently. Patient does not have a history of smoking. Patient has had a previous chest x-ray. Patient has not had a PPD done.      History of Obstructive Sleep Apnea:    Sleep Apnea  She presents for a sleep evaluation. She complains of snoring, periods of not breathing, sinus problems, congested nose.  Symptoms began 20 + years ago, unchanged since that time.  She goes to sleep at 10;30 weekdays and  weekends. She awakens 6 weekdays and 9 weekends. She falls asleep in 10 minutes.  Collar size na. She denies knees buckling with laughing, completely or partially paralyzed while falling asleep or waking up. Previous evaluation and treatment has included PSG.  Awakens due to coughin g  History of tonsillectomy for Obstructive Sleep Apnea   Stopped Continuous Positive Airway Pressure years ago    Past Medical History:   Diagnosis Date    Breast cancer, left 2014    Fibroids     Gout     HTN (hypertension)     Hyperlipidemia      Past Surgical History:   Procedure Laterality Date    BILATERAL SALPINGOOPHORECTOMY      BREAST BIOPSY Left 11/2014    BREAST LUMPECTOMY Left 12/2014    CERVICAL DISC SURGERY      HYSTERECTOMY  11/2013    [facility]    MYOMECTOMY       Review of patient's  allergies indicates:   Allergen Reactions    Hydromorphone (bulk) Itching    Meperidine Itching    Morphine Itching     Current Outpatient Medications on File Prior to Visit   Medication Sig Dispense Refill    ALLOPURINOL ORAL       anastrozole (ARIMIDEX) 1 mg Tab anastrozole 1 mg oral tablet take 1 tablet (1 mg) by oral route once daily   Active      ascorbic acid, vitamin C, (VITAMIN C) 1000 MG tablet Take 1,000 mg by mouth.      atorvastatin calcium (ATORVASTATIN ORAL)       azithromycin (ZITHROMAX Z-MEMO) 250 MG tablet 500 mg on day 1 (two tablets) followed by 250 mg once daily on days 2-5 6 tablet 0    calcium carbonate/vitamin D3 (VITAMIN D-3 ORAL)       CALCIUM ORAL Take by mouth.      clobetasoL (TEMOVATE) 0.05 % cream APPLY TO THE AFFECTED AREA EVERY DAY FOR 10 DAYS ONLY 60 g 0    cloNIDine (CATAPRES) 0.1 MG tablet clonidine HCl 0.1 mg oral tablet take 1 tablet (0.1 mg) by oral route 2 times per day for 30 days   Active      cyanocobalamin, vitamin B-12, 2,000 mcg Tab Take 1,000 mcg by mouth.      diltiaZEM (TIAZAC) 360 MG Cs24 Take 360 mg by mouth.      ergocalciferol (ERGOCALCIFEROL) 50,000 unit Cap       jessy prim-linoleic-gamolenic ac (PRIMROSE OIL) 1,000 mg Cap Take 1 capsule by mouth every morning.      gabapentin (NEURONTIN) 300 MG capsule Take 300 mg by mouth.      hydroCHLOROthiazide (HYDRODIURIL) 25 MG tablet Take 25 mg by mouth.      HYDROCHLOROTHIAZIDE ORAL       indomethacin (INDOCIN) 50 MG capsule Take by mouth.      miconazole NITRATE 2 % (MICOTIN) 2 % top powder Apply topically 2 (two) times daily as needed for Itching. 71 g 0    olmesartan (BENICAR) 40 MG tablet Take 40 mg by mouth.      omega-3 fatty acids/fish oil (FISH OIL-OMEGA-3 FATTY ACIDS) 300-1,000 mg capsule Take 2 g by mouth.      promethazine HCl (PHENERGAN ORAL)       VITAMIN B COMPLEX ORAL Take 1 tablet by mouth.       No current facility-administered medications on file prior to visit.     Social History     Socioeconomic  "History    Marital status: Single   Tobacco Use    Smoking status: Never    Smokeless tobacco: Never   Substance and Sexual Activity    Alcohol use: Never    Drug use: Never    Sexual activity: Yes     Partners: Male     Family History   Problem Relation Age of Onset    Hypertension Mother     Hypertension Father     Hypertension Maternal Grandmother     Heart disease Maternal Grandmother     Lung cancer Maternal Grandfather     Heart disease Paternal Grandmother     Breast cancer Paternal Aunt        Review of Systems   Constitutional:  Positive for fatigue. Negative for fever.   HENT:  Positive for postnasal drip, rhinorrhea and congestion.    Eyes:  Negative for redness and itching.   Respiratory:  Positive for cough, sputum production, shortness of breath, dyspnea on extertion, use of rescue inhaler and Paroxysmal Nocturnal Dyspnea.    Cardiovascular:  Negative for chest pain, palpitations and leg swelling.   Genitourinary:  Negative for difficulty urinating and hematuria.   Endocrine:  Negative for cold intolerance and heat intolerance.    Skin:  Negative for rash.   Gastrointestinal:  Negative for nausea and abdominal pain.   Neurological:  Negative for dizziness, syncope, weakness and light-headedness.   Hematological:  Negative for adenopathy. Does not bruise/bleed easily.   Psychiatric/Behavioral:  Negative for sleep disturbance. The patient is not nervous/anxious.      Objective:      /72   Pulse 85   Resp 17   Ht 5' 8" (1.727 m)   Wt 124.2 kg (273 lb 13 oz)   SpO2 97%   BMI 41.63 kg/m²   Physical Exam  Vitals and nursing note reviewed.   Constitutional:       Appearance: She is well-developed. She is obese.   HENT:      Head: Normocephalic and atraumatic.      Nose: Congestion present.      Mouth/Throat:      Pharynx: No oropharyngeal exudate.   Eyes:      Conjunctiva/sclera: Conjunctivae normal.      Pupils: Pupils are equal, round, and reactive to light.   Neck:      Thyroid: No thyromegaly. "      Vascular: No JVD.      Trachea: No tracheal deviation.   Cardiovascular:      Rate and Rhythm: Normal rate and regular rhythm.      Heart sounds: Normal heart sounds.   Pulmonary:      Effort: Pulmonary effort is normal. No respiratory distress.      Breath sounds: Examination of the right-lower field reveals wheezing. Examination of the left-lower field reveals wheezing. Decreased breath sounds and wheezing present. No rhonchi or rales.   Chest:      Chest wall: No tenderness.   Abdominal:      General: Bowel sounds are normal.      Palpations: Abdomen is soft.   Musculoskeletal:         General: Normal range of motion.      Cervical back: Neck supple.   Lymphadenopathy:      Cervical: No cervical adenopathy.   Skin:     General: Skin is warm and dry.   Neurological:      Mental Status: She is alert and oriented to person, place, and time.     Personal Diagnostic Review  Spirometry is normal. Spirometry is improved following bronchodilator administration. Lung volume determination is normal. Airway mechanics show airway resistance is increased. Specific conductance remains normal. DLCO is mildly decreased. MVV is mildly   decreased.     Pulmonary Studies Review 6/28/2023   SpO2 97   Height 68   Weight 4380.98   BMI (Calculated) 41.6   Predicted Distance 286.11   Predicted Distance Meters (Calculated) 417.52       CT Chest Without Contrast  Narrative: EXAMINATION:  CT CHEST WITHOUT CONTRAST    CLINICAL HISTORY:  Cough, persistent;HX of COVID 19 , HX of radiation breast on the left; Chronic cough    TECHNIQUE:  The chest was surveyed from the apices through the posterior costophrenic angles .  Data was reconstructed for multiplanar images in axial, sagittal and coronal planes in for maximal intensity projection images in the axial plane.    COMPARISON:  01/31/2023    FINDINGS:  Base of Neck: No significant abnormality.    Airways: Patent.    Lungs: Clear lungs.    Pleura: No pleural fluid.No pleural  calcification.    Fiona/Mediastinum: No pathologic pierre enlargement.    Esophagus: Normal.    Heart/pericardium: Normal size.  Trace pericardial effusion no calcification.    Pulmonary vasculature: Pulmonary arteries distribute normally.  There are four pulmonary veins.    Aorta: Left-sided aortic arch with 3 arterial branches.  The aorta maintains normal caliber, contour and course. There is mild calcification of the thoracic aorta.  There is  mild coronary artery calcification.    Thoracic soft tissues: Normal. Both breasts are present.    Bones: No acute fracture.  Mild multilevel spondylosis.  Postoperative changes with seen within the lower cervical spine mild canal stenosis at T2/3.  Moderate bilateral neural foraminal narrowing at seen at T3/4.  No suspicious lytic or sclerotic lesion.    Upper Abdomen: No abnormality of the partially imaged upper abdomen.  Bilateral renal hypodensities measuring up to 3.5 cm on the left and 2.7 cm on the right thought to reflect cysts.  Impression: Source of patient's chronic cough is not identified.    All CT scans at this facility use dose modulation, iterative reconstruction and/or weight based dosing when appropriate to reduce radiation dose to as low as reasonably achievable.    Electronically signed by: Francisco Dumont MD  Date:    06/28/2023  Time:    14:11      Office Spirometry Results:     No flowsheet data found.  Pulmonary Studies Review 6/28/2023   SpO2 97   Height 68   Weight 4380.98   BMI (Calculated) 41.6   Predicted Distance 286.11   Predicted Distance Meters (Calculated) 417.52         Assessment:            NELA (obstructive sleep apnea)  -     Home Sleep Study; Future; Expected date: 06/28/2023    Chronic cough  -     promethazine-dextromethorphan (PROMETHAZINE-DM) 6.25-15 mg/5 mL Syrp; Take 5 mLs by mouth 4 (four) times daily as needed (cough).  Dispense: 240 mL; Refill: 5    Cough variant asthma  -     promethazine-dextromethorphan (PROMETHAZINE-DM)  6.25-15 mg/5 mL Syrp; Take 5 mLs by mouth 4 (four) times daily as needed (cough).  Dispense: 240 mL; Refill: 5  -     fluticasone-salmeterol diskus inhaler 250-50 mcg; Inhale 1 puff into the lungs 2 (two) times daily. Controller.Wash out mouth after use  Dispense: 60 each; Refill: 11  -     albuterol (PROVENTIL/VENTOLIN HFA) 90 mcg/actuation inhaler; Inhale 2 puffs into the lungs every 4 (four) hours as needed for Wheezing or Shortness of Breath.  Dispense: 18 g; Refill: 11  -     albuterol (PROVENTIL) 2.5 mg /3 mL (0.083 %) nebulizer solution; Take 3 mLs (2.5 mg total) by nebulization every 4 to 6 hours as needed for Wheezing or Shortness of Breath.  Dispense: 360 mL; Refill: 11    Asthma with COPD  -     fluticasone-salmeterol diskus inhaler 250-50 mcg; Inhale 1 puff into the lungs 2 (two) times daily. Controller.Wash out mouth after use  Dispense: 60 each; Refill: 11  -     albuterol (PROVENTIL/VENTOLIN HFA) 90 mcg/actuation inhaler; Inhale 2 puffs into the lungs every 4 (four) hours as needed for Wheezing or Shortness of Breath.  Dispense: 18 g; Refill: 11  -     albuterol (PROVENTIL) 2.5 mg /3 mL (0.083 %) nebulizer solution; Take 3 mLs (2.5 mg total) by nebulization every 4 to 6 hours as needed for Wheezing or Shortness of Breath.  Dispense: 360 mL; Refill: 11  -     methylPREDNISolone (MEDROL DOSEPACK) 4 mg tablet; use as directed  Dispense: 1 each; Refill: 0  -     doxycycline (MONODOX) 100 MG capsule; Take 1 capsule (100 mg total) by mouth every 12 (twelve) hours.  Dispense: 20 capsule; Refill: 0    Nocturnal hypoxemia  -     Home Sleep Study; Future; Expected date: 06/28/2023          Outpatient Encounter Medications as of 6/28/2023   Medication Sig Dispense Refill    ALLOPURINOL ORAL       anastrozole (ARIMIDEX) 1 mg Tab anastrozole 1 mg oral tablet take 1 tablet (1 mg) by oral route once daily   Active      ascorbic acid, vitamin C, (VITAMIN C) 1000 MG tablet Take 1,000 mg by mouth.      atorvastatin  calcium (ATORVASTATIN ORAL)       azithromycin (ZITHROMAX Z-MEMO) 250 MG tablet 500 mg on day 1 (two tablets) followed by 250 mg once daily on days 2-5 6 tablet 0    calcium carbonate/vitamin D3 (VITAMIN D-3 ORAL)       CALCIUM ORAL Take by mouth.      clobetasoL (TEMOVATE) 0.05 % cream APPLY TO THE AFFECTED AREA EVERY DAY FOR 10 DAYS ONLY 60 g 0    cloNIDine (CATAPRES) 0.1 MG tablet clonidine HCl 0.1 mg oral tablet take 1 tablet (0.1 mg) by oral route 2 times per day for 30 days   Active      cyanocobalamin, vitamin B-12, 2,000 mcg Tab Take 1,000 mcg by mouth.      diltiaZEM (TIAZAC) 360 MG Cs24 Take 360 mg by mouth.      ergocalciferol (ERGOCALCIFEROL) 50,000 unit Cap       jessy prim-linoleic-gamolenic ac (PRIMROSE OIL) 1,000 mg Cap Take 1 capsule by mouth every morning.      gabapentin (NEURONTIN) 300 MG capsule Take 300 mg by mouth.      hydroCHLOROthiazide (HYDRODIURIL) 25 MG tablet Take 25 mg by mouth.      HYDROCHLOROTHIAZIDE ORAL       indomethacin (INDOCIN) 50 MG capsule Take by mouth.      miconazole NITRATE 2 % (MICOTIN) 2 % top powder Apply topically 2 (two) times daily as needed for Itching. 71 g 0    olmesartan (BENICAR) 40 MG tablet Take 40 mg by mouth.      omega-3 fatty acids/fish oil (FISH OIL-OMEGA-3 FATTY ACIDS) 300-1,000 mg capsule Take 2 g by mouth.      promethazine HCl (PHENERGAN ORAL)       VITAMIN B COMPLEX ORAL Take 1 tablet by mouth.      [DISCONTINUED] albuterol (PROVENTIL) 2.5 mg /3 mL (0.083 %) nebulizer solution Take 3 mLs (2.5 mg total) by nebulization every 4 to 6 hours as needed for Wheezing or Shortness of Breath. 360 mL 11    [DISCONTINUED] albuterol (PROVENTIL/VENTOLIN HFA) 90 mcg/actuation inhaler Inhale 2 puffs into the lungs every 4 (four) hours as needed for Wheezing or Shortness of Breath. 18 g 11    [DISCONTINUED] fluticasone-salmeterol diskus inhaler 250-50 mcg Inhale 1 puff into the lungs 2 (two) times daily. Controller.Wash out mouth after use 60 each 11    [DISCONTINUED]  promethazine-dextromethorphan (PROMETHAZINE-DM) 6.25-15 mg/5 mL Syrp Take 5 mLs by mouth 4 (four) times daily as needed (cough). 240 mL 5    albuterol (PROVENTIL) 2.5 mg /3 mL (0.083 %) nebulizer solution Take 3 mLs (2.5 mg total) by nebulization every 4 to 6 hours as needed for Wheezing or Shortness of Breath. 360 mL 11    albuterol (PROVENTIL/VENTOLIN HFA) 90 mcg/actuation inhaler Inhale 2 puffs into the lungs every 4 (four) hours as needed for Wheezing or Shortness of Breath. 18 g 11    doxycycline (MONODOX) 100 MG capsule Take 1 capsule (100 mg total) by mouth every 12 (twelve) hours. 20 capsule 0    fluticasone-salmeterol diskus inhaler 250-50 mcg Inhale 1 puff into the lungs 2 (two) times daily. Controller.Wash out mouth after use 60 each 11    methylPREDNISolone (MEDROL DOSEPACK) 4 mg tablet use as directed 1 each 0    promethazine-dextromethorphan (PROMETHAZINE-DM) 6.25-15 mg/5 mL Syrp Take 5 mLs by mouth 4 (four) times daily as needed (cough). 240 mL 5    [DISCONTINUED] clobetasoL (TEMOVATE) 0.05 % cream Apply topically once daily. for 10 days 60 g 0    [DISCONTINUED] predniSONE (DELTASONE) 20 MG tablet Prednisone 60 mg/ day for 3 days, 40 mg/day for 3 days,20 mg/ day for 3 days, (1/2 tablet )10 mg a day for 3 days. (Patient not taking: Reported on 6/28/2023) 20 tablet 0     No facility-administered encounter medications on file as of 6/28/2023.     Plan:       Requested Prescriptions     Signed Prescriptions Disp Refills    promethazine-dextromethorphan (PROMETHAZINE-DM) 6.25-15 mg/5 mL Syrp 240 mL 5     Sig: Take 5 mLs by mouth 4 (four) times daily as needed (cough).    fluticasone-salmeterol diskus inhaler 250-50 mcg 60 each 11     Sig: Inhale 1 puff into the lungs 2 (two) times daily. Controller.Wash out mouth after use    albuterol (PROVENTIL/VENTOLIN HFA) 90 mcg/actuation inhaler 18 g 11     Sig: Inhale 2 puffs into the lungs every 4 (four) hours as needed for Wheezing or Shortness of Breath.     albuterol (PROVENTIL) 2.5 mg /3 mL (0.083 %) nebulizer solution 360 mL 11     Sig: Take 3 mLs (2.5 mg total) by nebulization every 4 to 6 hours as needed for Wheezing or Shortness of Breath.    methylPREDNISolone (MEDROL DOSEPACK) 4 mg tablet 1 each 0     Sig: use as directed    doxycycline (MONODOX) 100 MG capsule 20 capsule 0     Sig: Take 1 capsule (100 mg total) by mouth every 12 (twelve) hours.     Problem List Items Addressed This Visit    None  Visit Diagnoses       NELA (obstructive sleep apnea)    -  Primary    Relevant Orders    Home Sleep Study    Chronic cough        Relevant Medications    promethazine-dextromethorphan (PROMETHAZINE-DM) 6.25-15 mg/5 mL Syrp    Cough variant asthma        Relevant Medications    promethazine-dextromethorphan (PROMETHAZINE-DM) 6.25-15 mg/5 mL Syrp    fluticasone-salmeterol diskus inhaler 250-50 mcg    albuterol (PROVENTIL/VENTOLIN HFA) 90 mcg/actuation inhaler    albuterol (PROVENTIL) 2.5 mg /3 mL (0.083 %) nebulizer solution    Asthma with COPD        Relevant Medications    fluticasone-salmeterol diskus inhaler 250-50 mcg    albuterol (PROVENTIL/VENTOLIN HFA) 90 mcg/actuation inhaler    albuterol (PROVENTIL) 2.5 mg /3 mL (0.083 %) nebulizer solution    methylPREDNISolone (MEDROL DOSEPACK) 4 mg tablet    doxycycline (MONODOX) 100 MG capsule    Nocturnal hypoxemia        Relevant Orders    Home Sleep Study               Follow up in about 4 weeks (around 7/26/2023) for Review Sleep Study - on return.    MEDICAL DECISION MAKING: Moderate to high complexity.  Overall, the multiple problems listed are of moderate to high severity that may impact quality of life and activities of daily living. Side effects of medications, treatment plan as well as options and alternatives reviewed and discussed with patient. There was counseling of patient concerning these issues.    Total time spent in counseling and coordination of care - 40  minutes of total time spent on the encounter,  which includes face to face time and non-face to face time preparing to see the patient (eg, review of tests), Obtaining and/or reviewing separately obtained history, Documenting clinical information in the electronic or other health record, Independently interpreting results (not separately reported) and communicating results to the patient/family/caregiver, or Care coordination (not separately reported).    Time was used in discussion of prognosis, risks, benefits of treatment, instructions and compliance with regimen . Discussion with other physicians and/or health care providers - home health or for use of durable medical equipment (oxygen, nebulizers, CPAP, BiPAP) occurred.

## 2023-08-02 ENCOUNTER — TELEPHONE (OUTPATIENT)
Dept: PULMONOLOGY | Facility: CLINIC | Age: 58
End: 2023-08-02
Payer: COMMERCIAL

## 2023-08-02 NOTE — TELEPHONE ENCOUNTER
Left vm asking if pt wants to reschedule appt for 8/4/23 since she has not done the sleep study.